# Patient Record
Sex: FEMALE | Race: WHITE | NOT HISPANIC OR LATINO | URBAN - METROPOLITAN AREA
[De-identification: names, ages, dates, MRNs, and addresses within clinical notes are randomized per-mention and may not be internally consistent; named-entity substitution may affect disease eponyms.]

---

## 2018-08-19 ENCOUNTER — OFFICE VISIT (OUTPATIENT)
Dept: URGENT CARE | Facility: CLINIC | Age: 68
End: 2018-08-19
Payer: MEDICARE

## 2018-08-19 VITALS
WEIGHT: 188 LBS | SYSTOLIC BLOOD PRESSURE: 142 MMHG | DIASTOLIC BLOOD PRESSURE: 86 MMHG | RESPIRATION RATE: 16 BRPM | HEIGHT: 64 IN | TEMPERATURE: 98.6 F | HEART RATE: 63 BPM | OXYGEN SATURATION: 99 % | BODY MASS INDEX: 32.1 KG/M2

## 2018-08-19 DIAGNOSIS — H60.392 OTHER INFECTIVE ACUTE OTITIS EXTERNA OF LEFT EAR: Primary | ICD-10-CM

## 2018-08-19 DIAGNOSIS — H66.002 ACUTE SUPPURATIVE OTITIS MEDIA OF LEFT EAR WITHOUT SPONTANEOUS RUPTURE OF TYMPANIC MEMBRANE, RECURRENCE NOT SPECIFIED: ICD-10-CM

## 2018-08-19 PROCEDURE — 99213 OFFICE O/P EST LOW 20 MIN: CPT | Performed by: FAMILY MEDICINE

## 2018-08-19 PROCEDURE — G0463 HOSPITAL OUTPT CLINIC VISIT: HCPCS | Performed by: FAMILY MEDICINE

## 2018-08-19 RX ORDER — AMOXICILLIN AND CLAVULANATE POTASSIUM 875; 125 MG/1; MG/1
1 TABLET, FILM COATED ORAL EVERY 12 HOURS SCHEDULED
Qty: 20 TABLET | Refills: 0 | Status: SHIPPED | OUTPATIENT
Start: 2018-08-19 | End: 2018-08-29

## 2018-08-19 RX ORDER — LOSARTAN POTASSIUM AND HYDROCHLOROTHIAZIDE 25; 100 MG/1; MG/1
1 TABLET ORAL DAILY
COMMUNITY

## 2018-08-19 RX ORDER — CITALOPRAM 10 MG/1
10 TABLET ORAL DAILY
COMMUNITY

## 2018-08-19 RX ORDER — MELOXICAM 7.5 MG/1
7.5 TABLET ORAL DAILY
COMMUNITY

## 2018-08-19 NOTE — PROGRESS NOTES
[facility]        NAME: Monalisa Brooks is a 78 y o  female  : 1950    MRN: [id number]  DATE: 2018  TIME: 1:01 PM    Assessment and Plan   Other infective acute otitis externa of left ear [H60 392]  1  Other infective acute otitis externa of left ear  neomycin-polymyxin-hydrocortisone (CORTISPORIN) otic solution   2  Acute suppurative otitis media of left ear without spontaneous rupture of tympanic membrane, recurrence not specified  amoxicillin-clavulanate (AUGMENTIN) 875-125 mg per tablet         Patient Instructions   Patient Instructions   Ear lavage left ear without instrumentation  Augmentin as directed  Cortisporin drops as directed  Follow-up PCP in 3-5 days if symptoms not improving        Proceed to  ER if symptoms worsen  Chief Complaint     Chief Complaint   Patient presents with   Em Alstrom     Wears b/l hearing aids  Left sided earache  Itchy on friday, painful yesterday, sounds muffled, pressure  Felt liquid in her left ear         History of Present Illness       Patient presents with 2 day history of left ear pain and drainage  Patient normally wears hearing aids, she is hard of hearing particularly in the left ear with ear pain  No discomfort in the right ear, no sinus congestion or pressure no allergy symptoms no URI symptoms  Review of Systems   Review of Systems   Constitutional: Negative  HENT: Positive for ear discharge, ear pain and hearing loss  Negative for congestion, sinus pain, sinus pressure and sore throat  Eyes: Negative  Respiratory: Negative  Cardiovascular: Negative  Neurological: Negative            Current Medications       Current Outpatient Prescriptions:     citalopram (CeleXA) 10 mg tablet, Take 10 mg by mouth daily, Disp: , Rfl:     losartan-hydrochlorothiazide (HYZAAR) 100-25 MG per tablet, Take 1 tablet by mouth daily, Disp: , Rfl:     meloxicam (MOBIC) 7 5 mg tablet, Take 7 5 mg by mouth daily, Disp: , Rfl:    amoxicillin-clavulanate (AUGMENTIN) 875-125 mg per tablet, Take 1 tablet by mouth every 12 (twelve) hours for 10 days, Disp: 20 tablet, Rfl: 0    neomycin-polymyxin-hydrocortisone (CORTISPORIN) otic solution, Administer 4 drops into the left ear every 6 (six) hours, Disp: 10 mL, Rfl: 0    Current Allergies     Allergies as of 08/19/2018 - Reviewed 08/19/2018   Allergen Reaction Noted    Sulfa antibiotics  08/19/2018            The following portions of the patient's history were reviewed and updated as appropriate: allergies, current medications, past family history, past medical history, past social history, past surgical history and problem list      No past medical history on file  No past surgical history on file  No family history on file  Medications have been verified  Objective   /86   Pulse 63   Temp 98 6 °F (37 °C)   Resp 16   Ht 5' 4" (1 626 m)   Wt 85 3 kg (188 lb)   SpO2 99%   BMI 32 27 kg/m²        Physical Exam     Physical Exam   Constitutional: She appears well-developed and well-nourished  She appears distressed  HENT:   Head: Normocephalic and atraumatic  Right Ear: External ear normal    Mouth/Throat: Oropharynx is clear and moist  No oropharyngeal exudate  Left canal obscured with exudates, after lavage this canal diffuse erythema as well as erythema edema the TM   Eyes: Conjunctivae are normal    Neck: Normal range of motion  Neck supple  Cardiovascular: Normal rate, regular rhythm and normal heart sounds  Pulmonary/Chest: Effort normal and breath sounds normal    Lymphadenopathy:     She has no cervical adenopathy

## 2018-08-19 NOTE — PATIENT INSTRUCTIONS
Ear lavage left ear without instrumentation  Augmentin as directed  Cortisporin drops as directed  Follow-up PCP in 3-5 days if symptoms not improving

## 2018-08-19 NOTE — PROGRESS NOTES
Ear cerumen removal  Date/Time: 8/19/2018 1:01 PM  Performed by: Gavi Zurita by: Rush Urbina     Patient location:  Clinic  Indications / Diagnosis:  Decreased hearing debris obstruction obscuring TM visualization  Consent:     Consent obtained:  Verbal    Consent given by:  Patient    Risks discussed:  Incomplete removal and TM perforation    Alternatives discussed:  No treatment and observation  Procedure details:     Location:  L ear    Procedure type: irrigation      Approach:  External  Post-procedure details:     Complication:  None    Hearing quality:  Improved    Patient tolerance of procedure:   Tolerated well, no immediate complications